# Patient Record
Sex: FEMALE | Race: WHITE | URBAN - METROPOLITAN AREA
[De-identification: names, ages, dates, MRNs, and addresses within clinical notes are randomized per-mention and may not be internally consistent; named-entity substitution may affect disease eponyms.]

---

## 2017-04-17 ENCOUNTER — OFFICE VISIT - RIVER FALLS (OUTPATIENT)
Dept: FAMILY MEDICINE | Facility: CLINIC | Age: 47
End: 2017-04-17

## 2017-04-19 ENCOUNTER — OFFICE VISIT - RIVER FALLS (OUTPATIENT)
Dept: FAMILY MEDICINE | Facility: CLINIC | Age: 47
End: 2017-04-19

## 2017-07-15 ENCOUNTER — HEALTH MAINTENANCE LETTER (OUTPATIENT)
Age: 47
End: 2017-07-15

## 2017-12-27 ENCOUNTER — OFFICE VISIT - RIVER FALLS (OUTPATIENT)
Dept: FAMILY MEDICINE | Facility: CLINIC | Age: 47
End: 2017-12-27

## 2019-11-03 ENCOUNTER — HEALTH MAINTENANCE LETTER (OUTPATIENT)
Age: 49
End: 2019-11-03

## 2020-11-16 ENCOUNTER — HEALTH MAINTENANCE LETTER (OUTPATIENT)
Age: 50
End: 2020-11-16

## 2021-02-07 ENCOUNTER — HEALTH MAINTENANCE LETTER (OUTPATIENT)
Age: 51
End: 2021-02-07

## 2021-09-18 ENCOUNTER — HEALTH MAINTENANCE LETTER (OUTPATIENT)
Age: 51
End: 2021-09-18

## 2022-01-08 ENCOUNTER — HEALTH MAINTENANCE LETTER (OUTPATIENT)
Age: 52
End: 2022-01-08

## 2022-02-12 VITALS
HEART RATE: 84 BPM | WEIGHT: 117 LBS | SYSTOLIC BLOOD PRESSURE: 100 MMHG | DIASTOLIC BLOOD PRESSURE: 68 MMHG | TEMPERATURE: 97.3 F | BODY MASS INDEX: 22.85 KG/M2

## 2022-02-15 NOTE — PROGRESS NOTES
Patient:   GERALDINE OSULLIVAN            MRN: [id number]            FIN: [id number]               Age:   47 years     Sex:  Female     :  1970   Associated Diagnoses:   None   Author:   Sushil Blackwood MD      Visit Information      Date of Service: 2017 03:00 pm  Performing Location: [facility]  Encounter#: [id number]      Primary Care Provider (PCP):  JOHNY MOON      Referring Provider:  Sushil Blackwood MD    NPI# [id number]      Chief Complaint   2017 3:14 PM CST   Patient presents for chest congestion x 6 weeks        History of Present Illness   CC as above and reviewed w  patient   cough and nasal/chest congestion, runny nose for 6 wks  son ill with similar for similar duration  no allergy sx or hx  nonsmoker  no difficulty breathing      Review of Systems            Health Status   Medications:  (Selected)   Prescriptions  Prescribed  Zithromax Z-Gus 250 mg oral tablet: Take 2 tablets on Day 1 and then 1 tablet, PO, Daily, Instructions: until finished, # 6 tab(s), 0 Refill(s), Type: Maintenance, Pharmacy: [id number], Take 2 tablets on Day 1 and then 1 tablet po daily,Instr:until finished  Documented Medications  Documented  CALCIUM (AS CITRATE)-VITAMIN D 250 MG-200 INTL UNITS ORAL TABLET (p36008): 1 tab(s), po, bid, tab(s), 0 Refill(s)  multivitamin with minerals (w/ Iron): 0 Refill(s), Type: Maintenance      Physical Examination   Vital Signs   2017 3:14 PM CST Temperature Tympanic 97.3 DegF  LOW    Peripheral Pulse Rate 84 bpm    Pulse Site Radial artery    HR Method Manual    Systolic Blood Pressure 100 mmHg    Diastolic Blood Pressure 68 mmHg    Mean Arterial Pressure 79 mmHg    BP Site Right arm    BP Method Manual      Measurements from flowsheet : Measurements   2017 3:14 PM CST   Weight Measured - Standard                117 lb       General - appears well  Nose - mild congestion b/l  ears normal tms b/l  oropharynx normal  neck  with no adenopathy  lungs ctab  heart rrr w/o m      Impression and Plan   cold/cough  - z-mark, if not effective consider non-infectiuos causes.

## 2022-03-05 ENCOUNTER — HEALTH MAINTENANCE LETTER (OUTPATIENT)
Age: 52
End: 2022-03-05

## 2022-11-20 ENCOUNTER — HEALTH MAINTENANCE LETTER (OUTPATIENT)
Age: 52
End: 2022-11-20

## 2023-04-15 ENCOUNTER — HEALTH MAINTENANCE LETTER (OUTPATIENT)
Age: 53
End: 2023-04-15